# Patient Record
Sex: FEMALE | Race: OTHER | HISPANIC OR LATINO | URBAN - METROPOLITAN AREA
[De-identification: names, ages, dates, MRNs, and addresses within clinical notes are randomized per-mention and may not be internally consistent; named-entity substitution may affect disease eponyms.]

---
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
## 2020-01-04 ENCOUNTER — EMERGENCY (EMERGENCY)
Facility: HOSPITAL | Age: 20
LOS: 0 days | Discharge: HOME | End: 2020-01-04
Attending: EMERGENCY MEDICINE | Admitting: EMERGENCY MEDICINE
Payer: MEDICAID

## 2020-01-04 VITALS
RESPIRATION RATE: 16 BRPM | DIASTOLIC BLOOD PRESSURE: 56 MMHG | SYSTOLIC BLOOD PRESSURE: 100 MMHG | HEART RATE: 104 BPM | OXYGEN SATURATION: 97 % | TEMPERATURE: 100 F

## 2020-01-04 VITALS
DIASTOLIC BLOOD PRESSURE: 65 MMHG | TEMPERATURE: 98 F | OXYGEN SATURATION: 99 % | SYSTOLIC BLOOD PRESSURE: 108 MMHG | HEART RATE: 99 BPM

## 2020-01-04 DIAGNOSIS — R50.9 FEVER, UNSPECIFIED: ICD-10-CM

## 2020-01-04 DIAGNOSIS — R63.0 ANOREXIA: ICD-10-CM

## 2020-01-04 DIAGNOSIS — R05 COUGH: ICD-10-CM

## 2020-01-04 PROCEDURE — 99283 EMERGENCY DEPT VISIT LOW MDM: CPT

## 2020-01-04 NOTE — ED PROVIDER NOTE - OBJECTIVE STATEMENT
19yF no pmhx UTD vax except flu vaccine c/o cough, fever tmax 103.5, mylagias, congestion x3 days; initially had decreased appetite and PO liquids but that has gotten better today; took 2 advil tabs once yesterday and 1 advil tab approx 1hr PTA. Pt is worried that fever has persisted for so long. Denies diarrhea.

## 2020-01-04 NOTE — ED PROVIDER NOTE - CLINICAL SUMMARY MEDICAL DECISION MAKING FREE TEXT BOX
pw cough, myalgias, and fever. lungs clear and exam nonfocal and nontoxic. Tolerating PO. Patient to be discharged from ED. Any available test results were discussed with patient and/or family. Verbal instructions given, including instructions to return to ED immediately for any new, worsening, or concerning symptoms. Patient endorsed understanding. Written discharge instructions additionally given, including follow-up plan.

## 2020-01-04 NOTE — ED PROVIDER NOTE - PATIENT PORTAL LINK FT
You can access the FollowMyHealth Patient Portal offered by [facility] by registering at the following website: [email or web address]. By joining [facility]’s FollowMyHealth portal, you will also be able to view your health information using other applications (apps) compatible with our system.

## 2020-01-04 NOTE — ED PROVIDER NOTE - NS ED ROS FT
Review of Systems:  	•	CONSTITUTIONAL - +fever, no diaphoresis  	•	SKIN - no rash, no lesions  	•	HEMATOLOGIC - no bleeding, no bruising  	•	EYES - no discharge, no injection  	•	ENT - no otorrhea, +rhinorrhea  	•	RESPIRATORY - no shortness of breath, +cough  	•	CARDIAC - no chest pain, no palpitations  	•	GI - no abd pain, no nausea, no vomiting, no diarrhea  	•	GENITO-URINARY - no dysuria, no hematuria  	•	MUSCULOSKELETAL - no joint paint, no swelling, no redness  	•	NEUROLOGIC - no dizziness, no headache

## 2020-01-04 NOTE — ED PROVIDER NOTE - CARE PROVIDER_API CALL
Annabella Hercules (MD)  Pediatrics  [address] [place] [address]  Phone: [phone]  Fax: [phone]  Follow Up Time: 1-3 Days

## 2020-01-04 NOTE — ED PROVIDER NOTE - ATTENDING CONTRIBUTION TO CARE
19y F no PMH, imm UTD, nl birth history, pw fever, mild cough, sore throat, tolerating PO, x 3 days. Responsive to advil, last dose this afternoon.  Exam: NAD, NCAT, HEENT: mmm, EOMI, PERRLA. OP nl, no swelling or exudates, no midline uvula deviation. TMs b/l clear, Neck: supple, nontender, nl ROM, Heart: tachycardia RR, no murmur, Lungs: BCTA, no signs of increased WOB, Abd: NTND, no guarding or rebound, no hernia palpated, no organomegaly, no CVAT. Skin: No rash. MSK: chest, back, and ext nontender, nl rom, no deformity. Neuro: Alert, cooperative, SUNSHINE equally, normal behavior, interaction and coordination for age.   A/p: Likely viral infection as pt has fever for short period and nonfocal symtoms, tolerating PO Given return precautions for prolonged fever and dehydration and instructions for home care and f/u.

## 2020-01-04 NOTE — ED PROVIDER NOTE - PHYSICAL EXAMINATION
Vital Signs: Reviewed  GEN: alert, NAD  HEAD:  normocephalic, atraumatic  EYES:  PERRLA; conjunctivae without injection, drainage or discharge  ENMT:  tympanic membranes pearly gray with normal landmarks; nasal mucosa moist; mouth moist without ulcerations or lesions; throat moist without erythema, exudate, ulcerations or lesions  NECK:  supple, no masses, no significant lymphadenopathy  CARDIAC:  regular rate, normal S1 and S2, no murmurs, rubs or gallops  RESP:  respiratory rate and effort appear normal for age; lungs are clear to auscultation bilaterally; no rales or wheezes  ABDOMEN:  soft, nontender, nondistended, no masses  MUSCULOSKELETAL/NEURO:  normal movement, normal tone  SKIN:  normal skin color for age and race, well-perfused; warm and dry

## 2020-01-05 DIAGNOSIS — M67.40 GANGLION, UNSPECIFIED SITE: Chronic | ICD-10-CM

## 2021-05-18 PROBLEM — Z78.9 OTHER SPECIFIED HEALTH STATUS: Chronic | Status: ACTIVE | Noted: 2020-01-05

## 2021-05-27 ENCOUNTER — APPOINTMENT (OUTPATIENT)
Dept: ANTEPARTUM | Facility: CLINIC | Age: 21
End: 2021-05-27
Payer: MEDICAID

## 2021-05-27 ENCOUNTER — ASOB RESULT (OUTPATIENT)
Age: 21
End: 2021-05-27

## 2021-05-27 VITALS
WEIGHT: 95 LBS | HEART RATE: 68 BPM | SYSTOLIC BLOOD PRESSURE: 100 MMHG | HEIGHT: 63 IN | BODY MASS INDEX: 16.83 KG/M2 | TEMPERATURE: 98 F | DIASTOLIC BLOOD PRESSURE: 60 MMHG

## 2021-05-27 DIAGNOSIS — Z36.9 ENCOUNTER FOR ANTENATAL SCREENING, UNSPECIFIED: ICD-10-CM

## 2021-05-27 PROCEDURE — 76813 OB US NUCHAL MEAS 1 GEST: CPT

## 2021-06-06 LAB
CLARI ADDITIONAL INFO: NORMAL
CLARI CHROMOSOME 13: NORMAL
CLARI CHROMOSOME 18: NORMAL
CLARI CHROMOSOME 21: NORMAL
CLARI SEX CHROMOSOMES: NORMAL
CLARITEST NIPT: NORMAL

## 2021-07-23 ENCOUNTER — APPOINTMENT (OUTPATIENT)
Dept: ANTEPARTUM | Facility: CLINIC | Age: 21
End: 2021-07-23
Payer: MEDICAID

## 2021-07-23 ENCOUNTER — ASOB RESULT (OUTPATIENT)
Age: 21
End: 2021-07-23

## 2021-07-23 VITALS
TEMPERATURE: 98 F | HEIGHT: 63 IN | DIASTOLIC BLOOD PRESSURE: 68 MMHG | WEIGHT: 106 LBS | SYSTOLIC BLOOD PRESSURE: 100 MMHG | BODY MASS INDEX: 18.78 KG/M2

## 2021-07-23 PROCEDURE — 76817 TRANSVAGINAL US OBSTETRIC: CPT | Mod: 26

## 2021-07-23 PROCEDURE — 76805 OB US >/= 14 WKS SNGL FETUS: CPT | Mod: 26

## 2021-10-13 ENCOUNTER — ASOB RESULT (OUTPATIENT)
Age: 21
End: 2021-10-13

## 2021-10-13 ENCOUNTER — APPOINTMENT (OUTPATIENT)
Dept: ANTEPARTUM | Facility: CLINIC | Age: 21
End: 2021-10-13
Payer: MEDICAID

## 2021-10-13 VITALS
SYSTOLIC BLOOD PRESSURE: 110 MMHG | HEIGHT: 63 IN | DIASTOLIC BLOOD PRESSURE: 60 MMHG | BODY MASS INDEX: 21.44 KG/M2 | WEIGHT: 121 LBS

## 2021-10-13 PROCEDURE — 76816 OB US FOLLOW-UP PER FETUS: CPT | Mod: 26

## 2021-11-20 ENCOUNTER — OUTPATIENT (OUTPATIENT)
Dept: OUTPATIENT SERVICES | Facility: HOSPITAL | Age: 21
LOS: 1 days | Discharge: HOME | End: 2021-11-20

## 2021-11-20 VITALS
HEART RATE: 76 BPM | DIASTOLIC BLOOD PRESSURE: 72 MMHG | TEMPERATURE: 98 F | SYSTOLIC BLOOD PRESSURE: 119 MMHG | RESPIRATION RATE: 20 BRPM

## 2021-11-20 VITALS — SYSTOLIC BLOOD PRESSURE: 129 MMHG | DIASTOLIC BLOOD PRESSURE: 80 MMHG | HEART RATE: 76 BPM

## 2021-11-20 DIAGNOSIS — M67.40 GANGLION, UNSPECIFIED SITE: Chronic | ICD-10-CM

## 2021-11-20 NOTE — OB PROVIDER TRIAGE NOTE - ADDITIONAL INSTRUCTIONS
-F/u with PMD at next visit on Wednesday  -PO maternal hydration, FKC, labor precautions  -Reassuring maternal and fetal status

## 2021-11-20 NOTE — OB PROVIDER TRIAGE NOTE - NS_OBGYNHISTORY_OBGYN_ALL_OB_FT
Ob Hx:   MAB x1, D&C unknown    Gyn Hx: Denies uterine fibroids, ovarian cysts, abnormal paps, STIs Ob Hx:   MAB x1, D&C unknown    Gyn Hx: Denies ovarian cysts, abnormal paps, STIs. History of fibroids noticed 3 years ago treated conservatively.

## 2021-11-20 NOTE — OB PROVIDER TRIAGE NOTE - HISTORY OF PRESENT ILLNESS
Anesthesia Evaluation     Patient summary reviewed and Nursing notes reviewed   NPO Solid Status: > 8 hours  NPO Liquid Status: > 8 hours           Airway   Mallampati: II  TM distance: >3 FB  Neck ROM: full  Dental    (+) poor dentition        Pulmonary    Cardiovascular   Exercise tolerance: good (4-7 METS)    Rhythm: regular  Rate: normal        Neuro/Psych  (+) headaches, psychiatric history,     GI/Hepatic/Renal/Endo      Musculoskeletal     Abdominal     Abdomen: soft.   Substance History      OB/GYN          Other                        Anesthesia Plan    ASA 2     general     intravenous induction     Anesthetic plan, all risks, benefits, and alternatives have been provided, discussed and informed consent has been obtained with: patient.    Plan discussed with CRNA.       22yo  at 38w3d, BIBIANA 21 by LMP (21) c/w 1st trim sono, presents to L&D with contractions. They originally began Thursday night but more recently are every 5 minutes, pain 7/10 intensity. Reports some white discharge for the last week. Denies LOF, VB. Reports good FM. Elevated 1 hr GCT, normal 3 hr GTT. Denies any complications this pregnancy. GBS negative.

## 2021-11-20 NOTE — OB PROVIDER TRIAGE NOTE - NSHPPHYSICALEXAM_GEN_ALL_CORE
Physical exam:    Vital Signs Last 24 Hrs  T(F): 98 (20 Nov 2021 03:39), Max: 98 (20 Nov 2021 03:39)  HR: 76 (20 Nov 2021 03:39) (76 - 76)  BP: 119/72 (20 Nov 2021 03:39) (119/72 - 119/72)  RR: 20 (20 Nov 2021 03:39) (20 - 20)    Gen: AAOx3, NAD  Abdomen: Soft, nontender, no distension, gravid, palpable contractions    EFM: 155/mod juan jose/pos accel  toco: q5-10  SVE: 1/80/-2, vtx, intact  BSS: vertex, posterior placenta, MVP 4.5cm, BPP 8/8

## 2021-11-20 NOTE — OB PROVIDER TRIAGE NOTE - NSOBPROVIDERNOTE_OBGYN_ALL_OB_FT
22yo  at 38w3d, GBS neg, likely in early labor, to ambulate with reassuring maternal and fetal status.   -NST reactive, BPP 8/8, MVP nl  -Patient does not desire pain medication at this time  -F/u with PMD at next visit on Wednesday  -PO maternal hydration, FKC, and labor precautions reviewed    Dr Byrne and Dr Kunz aware.

## 2021-11-20 NOTE — OB RN TRIAGE NOTE - CHIEF COMPLAINT QUOTE
pt presented to l&d via w/c accompanied by ed staff member. pt c/o contractions, pt denies rupture of membranes and small amount of vaginal bleeding.pt placed in triage EXAM A

## 2021-11-20 NOTE — OB PROVIDER TRIAGE NOTE - NSHPLABSRESULTS_GEN_ALL_CORE
10/28  GBS neg    9/10/21  HIV NR    1hr   3hr GTT 85/166/153/119  A1C 5.4%     4/19/21  Rubella nonimmune  O pos  Ab screen neg  RPR NR  HbS Ag NR  UCx pos for e.coli    Genetics:  AFP Quad screen neg  NIPT low risk  SMA neg  Fragile X neg  CF neg    Sonos:   @33w0d: +FHR, vtx, posterior placenta, MVP 4.29cm, EFW 2384g (79%)  @21w2d: +FHR, vtx, post placenta, MVP 4.4cm, EFW 444g, anatomy WNL, CL 4.7cm  @13w1d: +FHR, post placenta, NT 82%, NB present, adnexa WNL

## 2021-11-21 ENCOUNTER — INPATIENT (INPATIENT)
Facility: HOSPITAL | Age: 21
LOS: 3 days | Discharge: HOME | End: 2021-11-25
Attending: OBSTETRICS & GYNECOLOGY | Admitting: OBSTETRICS & GYNECOLOGY
Payer: MEDICAID

## 2021-11-21 DIAGNOSIS — M67.40 GANGLION, UNSPECIFIED SITE: Chronic | ICD-10-CM

## 2021-11-21 LAB
ALBUMIN SERPL ELPH-MCNC: 3.4 G/DL — LOW (ref 3.5–5.2)
ALP SERPL-CCNC: 417 U/L — HIGH (ref 30–115)
ALT FLD-CCNC: 53 U/L — HIGH (ref 0–41)
ANION GAP SERPL CALC-SCNC: 21 MMOL/L — HIGH (ref 7–14)
APPEARANCE UR: ABNORMAL
AST SERPL-CCNC: 40 U/L — SIGNIFICANT CHANGE UP (ref 0–41)
BACTERIA # UR AUTO: ABNORMAL
BASOPHILS # BLD AUTO: 0.04 K/UL — SIGNIFICANT CHANGE UP (ref 0–0.2)
BASOPHILS # BLD AUTO: 0.04 K/UL — SIGNIFICANT CHANGE UP (ref 0–0.2)
BASOPHILS NFR BLD AUTO: 0.3 % — SIGNIFICANT CHANGE UP (ref 0–1)
BASOPHILS NFR BLD AUTO: 0.3 % — SIGNIFICANT CHANGE UP (ref 0–1)
BILIRUB SERPL-MCNC: 1.8 MG/DL — HIGH (ref 0.2–1.2)
BILIRUB UR-MCNC: ABNORMAL
BLD GP AB SCN SERPL QL: SIGNIFICANT CHANGE UP
BUN SERPL-MCNC: 11 MG/DL — SIGNIFICANT CHANGE UP (ref 10–20)
CALCIUM SERPL-MCNC: 8.7 MG/DL — SIGNIFICANT CHANGE UP (ref 8.5–10.1)
CHLORIDE SERPL-SCNC: 97 MMOL/L — LOW (ref 98–110)
CO2 SERPL-SCNC: 14 MMOL/L — LOW (ref 17–32)
COLOR SPEC: ABNORMAL
CREAT SERPL-MCNC: 0.9 MG/DL — SIGNIFICANT CHANGE UP (ref 0.7–1.5)
DIFF PNL FLD: ABNORMAL
EOSINOPHIL # BLD AUTO: 0 K/UL — SIGNIFICANT CHANGE UP (ref 0–0.7)
EOSINOPHIL # BLD AUTO: 0 K/UL — SIGNIFICANT CHANGE UP (ref 0–0.7)
EOSINOPHIL NFR BLD AUTO: 0 % — SIGNIFICANT CHANGE UP (ref 0–8)
EOSINOPHIL NFR BLD AUTO: 0 % — SIGNIFICANT CHANGE UP (ref 0–8)
EPI CELLS # UR: 5 /HPF — SIGNIFICANT CHANGE UP (ref 0–5)
GLUCOSE SERPL-MCNC: 115 MG/DL — HIGH (ref 70–99)
GLUCOSE UR QL: NEGATIVE — SIGNIFICANT CHANGE UP
HCT VFR BLD CALC: 36.3 % — LOW (ref 37–47)
HCT VFR BLD CALC: 36.8 % — LOW (ref 37–47)
HGB BLD-MCNC: 12 G/DL — SIGNIFICANT CHANGE UP (ref 12–16)
HGB BLD-MCNC: 12.5 G/DL — SIGNIFICANT CHANGE UP (ref 12–16)
HYALINE CASTS # UR AUTO: 4 /LPF — SIGNIFICANT CHANGE UP (ref 0–7)
IMM GRANULOCYTES NFR BLD AUTO: 1 % — HIGH (ref 0.1–0.3)
IMM GRANULOCYTES NFR BLD AUTO: 1.1 % — HIGH (ref 0.1–0.3)
KETONES UR-MCNC: ABNORMAL
LACTATE SERPL-SCNC: 5.5 MMOL/L — CRITICAL HIGH (ref 0.7–2)
LEUKOCYTE ESTERASE UR-ACNC: ABNORMAL
LYMPHOCYTES # BLD AUTO: 1.05 K/UL — LOW (ref 1.2–3.4)
LYMPHOCYTES # BLD AUTO: 1.36 K/UL — SIGNIFICANT CHANGE UP (ref 1.2–3.4)
LYMPHOCYTES # BLD AUTO: 11.3 % — LOW (ref 20.5–51.1)
LYMPHOCYTES # BLD AUTO: 7.7 % — LOW (ref 20.5–51.1)
MCHC RBC-ENTMCNC: 30.4 PG — SIGNIFICANT CHANGE UP (ref 27–31)
MCHC RBC-ENTMCNC: 31.6 PG — HIGH (ref 27–31)
MCHC RBC-ENTMCNC: 32.6 G/DL — SIGNIFICANT CHANGE UP (ref 32–37)
MCHC RBC-ENTMCNC: 34.4 G/DL — SIGNIFICANT CHANGE UP (ref 32–37)
MCV RBC AUTO: 91.9 FL — SIGNIFICANT CHANGE UP (ref 81–99)
MCV RBC AUTO: 93.2 FL — SIGNIFICANT CHANGE UP (ref 81–99)
MONOCYTES # BLD AUTO: 0.4 K/UL — SIGNIFICANT CHANGE UP (ref 0.1–0.6)
MONOCYTES # BLD AUTO: 0.83 K/UL — HIGH (ref 0.1–0.6)
MONOCYTES NFR BLD AUTO: 3.3 % — SIGNIFICANT CHANGE UP (ref 1.7–9.3)
MONOCYTES NFR BLD AUTO: 6.1 % — SIGNIFICANT CHANGE UP (ref 1.7–9.3)
NEUTROPHILS # BLD AUTO: 10.12 K/UL — HIGH (ref 1.4–6.5)
NEUTROPHILS # BLD AUTO: 11.62 K/UL — HIGH (ref 1.4–6.5)
NEUTROPHILS NFR BLD AUTO: 84 % — HIGH (ref 42.2–75.2)
NEUTROPHILS NFR BLD AUTO: 84.9 % — HIGH (ref 42.2–75.2)
NITRITE UR-MCNC: POSITIVE
NRBC # BLD: 0 /100 WBCS — SIGNIFICANT CHANGE UP (ref 0–0)
NRBC # BLD: 0 /100 WBCS — SIGNIFICANT CHANGE UP (ref 0–0)
PH UR: 6.5 — SIGNIFICANT CHANGE UP (ref 5–8)
PLATELET # BLD AUTO: 359 K/UL — SIGNIFICANT CHANGE UP (ref 130–400)
PLATELET # BLD AUTO: 395 K/UL — SIGNIFICANT CHANGE UP (ref 130–400)
POTASSIUM SERPL-MCNC: 4.2 MMOL/L — SIGNIFICANT CHANGE UP (ref 3.5–5)
POTASSIUM SERPL-SCNC: 4.2 MMOL/L — SIGNIFICANT CHANGE UP (ref 3.5–5)
PRENATAL SYPHILIS TEST: SIGNIFICANT CHANGE UP
PROT SERPL-MCNC: 6.3 G/DL — SIGNIFICANT CHANGE UP (ref 6–8)
PROT UR-MCNC: ABNORMAL
RBC # BLD: 3.95 M/UL — LOW (ref 4.2–5.4)
RBC # BLD: 3.95 M/UL — LOW (ref 4.2–5.4)
RBC # FLD: 13 % — SIGNIFICANT CHANGE UP (ref 11.5–14.5)
RBC # FLD: 13.2 % — SIGNIFICANT CHANGE UP (ref 11.5–14.5)
RBC CASTS # UR COMP ASSIST: 34 /HPF — HIGH (ref 0–4)
SARS-COV-2 RNA SPEC QL NAA+PROBE: SIGNIFICANT CHANGE UP
SODIUM SERPL-SCNC: 132 MMOL/L — LOW (ref 135–146)
SP GR SPEC: 1.03 — SIGNIFICANT CHANGE UP (ref 1.01–1.03)
UROBILINOGEN FLD QL: ABNORMAL
WBC # BLD: 12.05 K/UL — HIGH (ref 4.8–10.8)
WBC # BLD: 13.67 K/UL — HIGH (ref 4.8–10.8)
WBC # FLD AUTO: 12.05 K/UL — HIGH (ref 4.8–10.8)
WBC # FLD AUTO: 13.67 K/UL — HIGH (ref 4.8–10.8)
WBC UR QL: 210 /HPF — HIGH (ref 0–5)

## 2021-11-21 RX ORDER — AMPICILLIN TRIHYDRATE 250 MG
2 CAPSULE ORAL EVERY 6 HOURS
Refills: 0 | Status: DISCONTINUED | OUTPATIENT
Start: 2021-11-21 | End: 2021-11-22

## 2021-11-21 RX ORDER — GENTAMICIN SULFATE 40 MG/ML
80 VIAL (ML) INJECTION EVERY 8 HOURS
Refills: 0 | Status: DISCONTINUED | OUTPATIENT
Start: 2021-11-21 | End: 2021-11-22

## 2021-11-21 RX ORDER — OXYTOCIN 10 UNIT/ML
333.33 VIAL (ML) INJECTION
Qty: 20 | Refills: 0 | Status: DISCONTINUED | OUTPATIENT
Start: 2021-11-21 | End: 2021-11-22

## 2021-11-21 RX ORDER — DEXAMETHASONE 0.5 MG/5ML
4 ELIXIR ORAL EVERY 6 HOURS
Refills: 0 | Status: DISCONTINUED | OUTPATIENT
Start: 2021-11-21 | End: 2021-11-22

## 2021-11-21 RX ORDER — ACETAMINOPHEN 500 MG
650 TABLET ORAL ONCE
Refills: 0 | Status: COMPLETED | OUTPATIENT
Start: 2021-11-21 | End: 2021-11-21

## 2021-11-21 RX ORDER — AMPICILLIN TRIHYDRATE 250 MG
CAPSULE ORAL
Refills: 0 | Status: DISCONTINUED | OUTPATIENT
Start: 2021-11-21 | End: 2021-11-22

## 2021-11-21 RX ORDER — NALOXONE HYDROCHLORIDE 4 MG/.1ML
0.1 SPRAY NASAL
Refills: 0 | Status: DISCONTINUED | OUTPATIENT
Start: 2021-11-21 | End: 2021-11-22

## 2021-11-21 RX ORDER — AMPICILLIN TRIHYDRATE 250 MG
2 CAPSULE ORAL ONCE
Refills: 0 | Status: COMPLETED | OUTPATIENT
Start: 2021-11-21 | End: 2021-11-21

## 2021-11-21 RX ORDER — FENTANYL/BUPIVACAINE/NS/PF 2MCG/ML-.1
250 PLASTIC BAG, INJECTION (ML) INJECTION
Refills: 0 | Status: DISCONTINUED | OUTPATIENT
Start: 2021-11-21 | End: 2021-11-22

## 2021-11-21 RX ORDER — ONDANSETRON 8 MG/1
4 TABLET, FILM COATED ORAL EVERY 6 HOURS
Refills: 0 | Status: DISCONTINUED | OUTPATIENT
Start: 2021-11-21 | End: 2021-11-22

## 2021-11-21 RX ORDER — CEFTRIAXONE 500 MG/1
1000 INJECTION, POWDER, FOR SOLUTION INTRAMUSCULAR; INTRAVENOUS EVERY 24 HOURS
Refills: 0 | Status: DISCONTINUED | OUTPATIENT
Start: 2021-11-21 | End: 2021-11-21

## 2021-11-21 RX ORDER — CITRIC ACID/SODIUM CITRATE 300-500 MG
15 SOLUTION, ORAL ORAL EVERY 6 HOURS
Refills: 0 | Status: DISCONTINUED | OUTPATIENT
Start: 2021-11-21 | End: 2021-11-21

## 2021-11-21 RX ORDER — SODIUM CHLORIDE 9 MG/ML
1000 INJECTION, SOLUTION INTRAVENOUS
Refills: 0 | Status: DISCONTINUED | OUTPATIENT
Start: 2021-11-21 | End: 2021-11-22

## 2021-11-21 RX ADMIN — Medication 650 MILLIGRAM(S): at 17:41

## 2021-11-21 RX ADMIN — Medication 650 MILLIGRAM(S): at 17:42

## 2021-11-21 RX ADMIN — Medication 104 MILLIGRAM(S): at 13:33

## 2021-11-21 RX ADMIN — Medication 650 MILLIGRAM(S): at 13:45

## 2021-11-21 RX ADMIN — CEFTRIAXONE 100 MILLIGRAM(S): 500 INJECTION, POWDER, FOR SOLUTION INTRAMUSCULAR; INTRAVENOUS at 15:34

## 2021-11-21 RX ADMIN — Medication 216 GRAM(S): at 20:32

## 2021-11-21 RX ADMIN — Medication 104 MILLIGRAM(S): at 22:03

## 2021-11-21 RX ADMIN — Medication 650 MILLIGRAM(S): at 18:42

## 2021-11-21 RX ADMIN — Medication 216 GRAM(S): at 13:48

## 2021-11-21 RX ADMIN — Medication 650 MILLIGRAM(S): at 13:46

## 2021-11-21 NOTE — OB PROVIDER H&P - ATTENDING COMMENTS
IMP: IUP @ 38.4wks, Chorio, in early labor    Plan: Admit, IV-Abx, Blood Cultures, Pain Management, Tylenol, Anticipated

## 2021-11-21 NOTE — PROGRESS NOTE ADULT - ASSESSMENT
20yo  @38w4d, GBS neg, s/p SROM clear @1100 on , with chorioamnionitis vs pyelonephritis, febrile, early in labor, s/p AROM forewaters blood tinged @1510, with maternal and fetal tachycardia, cat II tracing.  -Cooling blanket started at 2100  -Resuscitative measures performed with oxygen, repositioning and IV hydration  -Continue amp and gent  -Pain management  -Vital signs  -f/u UDS and UCx    Dr. Magana and Dr. Kunz aware   22yo  @38w4d, GBS neg, s/p SROM clear @1100 on , with chorioamnionitis vs pyelonephritis, febrile, early in labor, s/p AROM forewaters blood tinged @1510, with maternal and fetal tachycardia, elevated lactate, and with cat II tracing.  -Cooling blanket started at 2100  -Resuscitative measures performed with oxygen, repositioning and IV hydration  -Continue amp and gent  -Pain management  -Vital signs  -f/u UDS and UCx    Dr. Magana and Dr. Kunz aware

## 2021-11-21 NOTE — OB PROVIDER H&P - NSHPLABSRESULTS_GEN_ALL_CORE
Labs:  HCV NR  varicella immune  SMA neg  CF neg  Fragile X neg    GTT 85/166/153/119  A1C 5.4%  Sequential 1/2 low risk  NIPT low risk XY    Sono:  10/13: 3w0d, EFW 2384g (79%), MVP 4.29cm  7/23: 21w2d, EFW 444g, MVP 4.4cm, normal anatomy

## 2021-11-21 NOTE — OB RN DELIVERY SUMMARY - NSSELHIDDEN_OBGYN_ALL_OB_FT
[NS_DeliveryAttending1_OBGYN_ALL_OB_FT:[id number]] [NS_DeliveryAttending1_OBGYN_ALL_OB_FT:[id number]],[NS_DeliveryRN_OBGYN_ALL_OB_FT:[id number]],[NS_CirculateRN2_OBGYN_ALL_OB_FT:[id number]]

## 2021-11-21 NOTE — PROGRESS NOTE ADULT - SUBJECTIVE AND OBJECTIVE BOX
PGY4 Note    Patient seen at bedside for evaluation, feel comfortable after epidural.    Vital signs  T(F): 99.68 ( @ 12:34), Max: 99.68 ( @ 12:34)  HR: 83 ( @ 15:01)  BP: 118/65 ( @ 15:01) (112/67 - 136/74)  RR: --  EFM: 165/mod/+accels  TOCO: q 1-3min  SVE: 4//-1 AROM forewaters blood tinged    Medications:  acetaminophen     Tablet ..: 650 ( @ 13:45)  acetaminophen  Suppository ..: 650 ( @ 13:46)  ampicillin  IVPB: 216 ( @ 13:48)  gentamicin   IVPB: 104 ( @ 13:33)      Labs:                        12.5   13.67 )-----------( 395      ( 2021 11:51 )             36.3           Prenatal Syphilis Test: Nonreact ( @ 11:51)  Antibody Screen: NEG (21 @ 11:51)    Urinalysis Basic - ( 2021 12:01 )    Color: [place] / Appearance: Slightly Turbid / S.026 / pH: x  Gluc: x / Ketone: Large  / Bili: Small / Urobili: 3 mg/dL   Blood: x / Protein: 100 mg/dL / Nitrite: Positive   Leuk Esterase: Large / RBC: 34 /HPF /  /HPF   Sq Epi: x / Non Sq Epi: 5 /HPF / Bacteria: Many      UDS received and pending      Urine culture pending collection (RN aware to collect)

## 2021-11-21 NOTE — OB PROVIDER H&P - HISTORY OF PRESENT ILLNESS
22yo  @38w4d with BIBIANA  by LMP  consistent with first trimester sonogram presenting to L&D for clear leakage of fluid since yesterday @1100 and contractions for the last 3 days.  Contractions are now q4m, desires pain management with epidural.  Denies vaginal bleeding. Good fetal movement. Denies any complications with this pregnancy. GBS neg.    On admission, patient's HR was 108 and fetal heart rate was tachycardic to 170s.   Patient's rectal temperature was 101.7F.

## 2021-11-21 NOTE — OB PROVIDER H&P - ASSESSMENT
20yo  @38w4d, GBS neg, s/p SROM clear, with chorioamnionitis, in early labor.    - admit to L&D  - cont efm/toco  - clear liquid diet, IVF  - pain management with epidural  - ampicillin/gentamicin for chorioamnionitis  - f/u admission labs, COVID swab    Dr. Kunz aware.

## 2021-11-21 NOTE — OB PROVIDER H&P - NSHPPHYSICALEXAM_GEN_ALL_CORE
Vital Signs Last 24 Hrs  HR: 117 (21 Nov 2021 11:28) (117 - 117)  BP: 132/83 (21 Nov 2021 11:28) (132/83 - 132/83)    Gen: NAD, sitting comfortably  Abd: Gravid, soft, NT, strongly palpable ctx  SVE: 3/90/-2, vtx, with palpable forewaters  EFM: 170/mod/+accels  [place]: q3m  Sono: cephalic

## 2021-11-21 NOTE — PROGRESS NOTE ADULT - ASSESSMENT
20yo  @38w4d, GBS neg, s/p SROM clear @1100 on , with chorioamnionitis vs pyelonephritis, in labor, s/p AROM forewaters blood tinged, with cat II tracing    -resuscitative measures performed with oxygen, repositioning and IV hyration  -Continue amp and gent  -1 dose of Rocephin  -Pain management  -Vital signs  -f/u pending Uctx   -f/u UDS    Dr. Kunz aware

## 2021-11-21 NOTE — PROGRESS NOTE ADULT - SUBJECTIVE AND OBJECTIVE BOX
PGY1 Note    S: Patient seen and examined at bedside. Patient is comfortable s/p epidural.     Vital Signs Last 24 Hrs  T(C): 39.5 (2021 18:31), Max: 39.5 (2021 18:31)  T(F): 103.1 (2021 18:31), Max: 103.1 (2021 18:31)  HR: 107 (2021 21:00) (81 - 174)  BP: 123/80 (2021 21:00) (101/60 - 148/77)  SpO2: 100% (2021 19:58) (93% - 100%)    EFM: 180/min juan jose/late decels  TOCO: q5  SVE: deferred at this time, last exam @1810: 4.5/100/0, vertex, ruptured clear    Labs:                        12.0   12.05 )-----------( 359      ( 2021 18:09 )             36.8           132<L>  |  97<L>  |  11  ----------------------------<  115<H>  4.2   |  14<L>  |  0.9    Ca    8.7      2021 18:09    TPro  6.3  /  Alb  3.4<L>  /  TBili  1.8<H>  /  DBili  x   /  AST  40  /  ALT  53<H>  /  AlkPhos  417<H>      ABO RH Interpretation: O POS (21 @ 11:51)    Urinalysis Basic - ( 2021 12:01 )  Color: [place] / Appearance: Slightly Turbid / S.026 / pH: x  Gluc: x / Ketone: Large  / Bili: Small / Urobili: 3 mg/dL   Blood: x / Protein: 100 mg/dL / Nitrite: Positive   Leuk Esterase: Large / RBC: 34 /HPF /  /HPF   Sq Epi: x / Non Sq Epi: 5 /HPF / Bacteria: Many    Prenatal Syphilis Test: Nonreact (21 @ 11:51)    UDS: pending  Covid: neg  UCx: pending    MEDICATIONS  (STANDING):  ampicillin  IVPB      ampicillin  IVPB 2 Gram(s) IV Intermittent every 6 hours  fentanyl (2 MICROgram(s)/mL) + bupivacaine 0.1%  in Sodium Chloride 0.9% Epidural Drip 250 milliLiter(s) Epidural Drip <Continuous> @1340  gentamicin   IVPB 80 milliGRAM(s) IV Intermittent every 8 hours @ 1330    Cooling blanket @ 2100 PGY1 Note    S: Patient seen and examined at bedside. Patient is comfortable s/p epidural.     Vital Signs Last 24 Hrs  T(C): 39.5 (2021 18:31), Max: 39.5 (2021 18:31)  T(F): 103.1 (2021 18:31), Max: 103.1 (2021 18:31)  HR: 107 (2021 21:00) (81 - 174)  BP: 123/80 (2021 21:00) (101/60 - 148/77)  SpO2: 100% (2021 19:58) (93% - 100%)    EFM: 180/min juan jose/late decels  TOCO: q5  SVE: deferred at this time, last exam @1810: 4.5/100/0, vertex, ruptured clear    Labs:                        12.0   12.05 )-----------( 359      ( 2021 18:09 )             36.8           132<L>  |  97<L>  |  11  ----------------------------<  115<H>  4.2   |  14<L>  |  0.9    Ca    8.7      2021 18:09    TPro  6.3  /  Alb  3.4<L>  /  TBili  1.8<H>  /  DBili  x   /  AST  40  /  ALT  53<H>  /  AlkPhos  417<H>      ABO RH Interpretation: O POS (21 @ 11:51)    Urinalysis Basic - ( 2021 12:01 )  Color: [place] / Appearance: Slightly Turbid / S.026 / pH: x  Gluc: x / Ketone: Large  / Bili: Small / Urobili: 3 mg/dL   Blood: x / Protein: 100 mg/dL / Nitrite: Positive   Leuk Esterase: Large / RBC: 34 /HPF /  /HPF   Sq Epi: x / Non Sq Epi: 5 /HPF / Bacteria: Many    Lactate @1800: 5.5    Prenatal Syphilis Test: Nonreact (21 @ 11:51)    UDS: pending  Covid: neg  UCx: pending    MEDICATIONS  (STANDING):  ampicillin  IVPB      ampicillin  IVPB 2 Gram(s) IV Intermittent every 6 hours  fentanyl (2 MICROgram(s)/mL) + bupivacaine 0.1%  in Sodium Chloride 0.9% Epidural Drip 250 milliLiter(s) Epidural Drip <Continuous> @1340  gentamicin   IVPB 80 milliGRAM(s) IV Intermittent every 8 hours @ 1330    Cooling blanket @ 2100

## 2021-11-22 ENCOUNTER — RESULT REVIEW (OUTPATIENT)
Age: 21
End: 2021-11-22

## 2021-11-22 LAB
ALBUMIN SERPL ELPH-MCNC: 2.1 G/DL — LOW (ref 3.5–5.2)
ALBUMIN SERPL ELPH-MCNC: 2.7 G/DL — LOW (ref 3.5–5.2)
ALP SERPL-CCNC: 239 U/L — HIGH (ref 30–115)
ALP SERPL-CCNC: 338 U/L — HIGH (ref 30–115)
ALT FLD-CCNC: 31 U/L — SIGNIFICANT CHANGE UP (ref 0–41)
ALT FLD-CCNC: 48 U/L — HIGH (ref 0–41)
AMPHET UR-MCNC: NEGATIVE — SIGNIFICANT CHANGE UP
ANION GAP SERPL CALC-SCNC: 13 MMOL/L — SIGNIFICANT CHANGE UP (ref 7–14)
ANION GAP SERPL CALC-SCNC: 17 MMOL/L — HIGH (ref 7–14)
AST SERPL-CCNC: 41 U/L — SIGNIFICANT CHANGE UP (ref 0–41)
AST SERPL-CCNC: 43 U/L — HIGH (ref 0–41)
BARBITURATES UR SCN-MCNC: NEGATIVE — SIGNIFICANT CHANGE UP
BASOPHILS # BLD AUTO: 0.02 K/UL — SIGNIFICANT CHANGE UP (ref 0–0.2)
BASOPHILS # BLD AUTO: 0.02 K/UL — SIGNIFICANT CHANGE UP (ref 0–0.2)
BASOPHILS NFR BLD AUTO: 0.1 % — SIGNIFICANT CHANGE UP (ref 0–1)
BASOPHILS NFR BLD AUTO: 0.2 % — SIGNIFICANT CHANGE UP (ref 0–1)
BENZODIAZ UR-MCNC: NEGATIVE — SIGNIFICANT CHANGE UP
BILIRUB SERPL-MCNC: 0.5 MG/DL — SIGNIFICANT CHANGE UP (ref 0.2–1.2)
BILIRUB SERPL-MCNC: 1.8 MG/DL — HIGH (ref 0.2–1.2)
BUN SERPL-MCNC: 14 MG/DL — SIGNIFICANT CHANGE UP (ref 10–20)
BUN SERPL-MCNC: 16 MG/DL — SIGNIFICANT CHANGE UP (ref 10–20)
BUPRENORPHINE SCREEN, URINE RESULT: NEGATIVE — SIGNIFICANT CHANGE UP
CALCIUM SERPL-MCNC: 8 MG/DL — LOW (ref 8.5–10.1)
CALCIUM SERPL-MCNC: 8.4 MG/DL — LOW (ref 8.5–10.1)
CHLORIDE SERPL-SCNC: 107 MMOL/L — SIGNIFICANT CHANGE UP (ref 98–110)
CHLORIDE SERPL-SCNC: 94 MMOL/L — LOW (ref 98–110)
CO2 SERPL-SCNC: 16 MMOL/L — LOW (ref 17–32)
CO2 SERPL-SCNC: 18 MMOL/L — SIGNIFICANT CHANGE UP (ref 17–32)
COCAINE METAB.OTHER UR-MCNC: NEGATIVE — SIGNIFICANT CHANGE UP
CREAT SERPL-MCNC: 0.7 MG/DL — SIGNIFICANT CHANGE UP (ref 0.7–1.5)
CREAT SERPL-MCNC: 1.1 MG/DL — SIGNIFICANT CHANGE UP (ref 0.7–1.5)
EOSINOPHIL # BLD AUTO: 0 K/UL — SIGNIFICANT CHANGE UP (ref 0–0.7)
EOSINOPHIL # BLD AUTO: 0.02 K/UL — SIGNIFICANT CHANGE UP (ref 0–0.7)
EOSINOPHIL NFR BLD AUTO: 0 % — SIGNIFICANT CHANGE UP (ref 0–8)
EOSINOPHIL NFR BLD AUTO: 0.2 % — SIGNIFICANT CHANGE UP (ref 0–8)
FENTANYL UR QL: NEGATIVE — SIGNIFICANT CHANGE UP
GLUCOSE SERPL-MCNC: 110 MG/DL — HIGH (ref 70–99)
GLUCOSE SERPL-MCNC: 77 MG/DL — SIGNIFICANT CHANGE UP (ref 70–99)
HCT VFR BLD CALC: 24.9 % — LOW (ref 37–47)
HCT VFR BLD CALC: 32.4 % — LOW (ref 37–47)
HGB BLD-MCNC: 11 G/DL — LOW (ref 12–16)
HGB BLD-MCNC: 8.3 G/DL — LOW (ref 12–16)
IMM GRANULOCYTES NFR BLD AUTO: 1.1 % — HIGH (ref 0.1–0.3)
IMM GRANULOCYTES NFR BLD AUTO: 1.1 % — HIGH (ref 0.1–0.3)
L&D DRUG SCREEN, URINE: SIGNIFICANT CHANGE UP
LACTATE SERPL-SCNC: 1.6 MMOL/L — SIGNIFICANT CHANGE UP (ref 0.7–2)
LYMPHOCYTES # BLD AUTO: 0.48 K/UL — LOW (ref 1.2–3.4)
LYMPHOCYTES # BLD AUTO: 1.07 K/UL — LOW (ref 1.2–3.4)
LYMPHOCYTES # BLD AUTO: 3.6 % — LOW (ref 20.5–51.1)
LYMPHOCYTES # BLD AUTO: 8.7 % — LOW (ref 20.5–51.1)
MCHC RBC-ENTMCNC: 30.7 PG — SIGNIFICANT CHANGE UP (ref 27–31)
MCHC RBC-ENTMCNC: 30.9 PG — SIGNIFICANT CHANGE UP (ref 27–31)
MCHC RBC-ENTMCNC: 33.3 G/DL — SIGNIFICANT CHANGE UP (ref 32–37)
MCHC RBC-ENTMCNC: 34 G/DL — SIGNIFICANT CHANGE UP (ref 32–37)
MCV RBC AUTO: 91 FL — SIGNIFICANT CHANGE UP (ref 81–99)
MCV RBC AUTO: 92.2 FL — SIGNIFICANT CHANGE UP (ref 81–99)
METHADONE UR-MCNC: NEGATIVE — SIGNIFICANT CHANGE UP
MONOCYTES # BLD AUTO: 0.32 K/UL — SIGNIFICANT CHANGE UP (ref 0.1–0.6)
MONOCYTES # BLD AUTO: 0.39 K/UL — SIGNIFICANT CHANGE UP (ref 0.1–0.6)
MONOCYTES NFR BLD AUTO: 2.4 % — SIGNIFICANT CHANGE UP (ref 1.7–9.3)
MONOCYTES NFR BLD AUTO: 3.2 % — SIGNIFICANT CHANGE UP (ref 1.7–9.3)
NEUTROPHILS # BLD AUTO: 10.65 K/UL — HIGH (ref 1.4–6.5)
NEUTROPHILS # BLD AUTO: 12.39 K/UL — HIGH (ref 1.4–6.5)
NEUTROPHILS NFR BLD AUTO: 86.6 % — HIGH (ref 42.2–75.2)
NEUTROPHILS NFR BLD AUTO: 92.8 % — HIGH (ref 42.2–75.2)
NRBC # BLD: 0 /100 WBCS — SIGNIFICANT CHANGE UP (ref 0–0)
NRBC # BLD: 0 /100 WBCS — SIGNIFICANT CHANGE UP (ref 0–0)
OPIATES UR-MCNC: NEGATIVE — SIGNIFICANT CHANGE UP
OXYCODONE UR-MCNC: NEGATIVE — SIGNIFICANT CHANGE UP
PCP UR-MCNC: NEGATIVE — SIGNIFICANT CHANGE UP
PLATELET # BLD AUTO: 260 K/UL — SIGNIFICANT CHANGE UP (ref 130–400)
PLATELET # BLD AUTO: 295 K/UL — SIGNIFICANT CHANGE UP (ref 130–400)
POTASSIUM SERPL-MCNC: 3.5 MMOL/L — SIGNIFICANT CHANGE UP (ref 3.5–5)
POTASSIUM SERPL-MCNC: 4 MMOL/L — SIGNIFICANT CHANGE UP (ref 3.5–5)
POTASSIUM SERPL-SCNC: 3.5 MMOL/L — SIGNIFICANT CHANGE UP (ref 3.5–5)
POTASSIUM SERPL-SCNC: 4 MMOL/L — SIGNIFICANT CHANGE UP (ref 3.5–5)
PROPOXYPHENE QUALITATIVE URINE RESULT: NEGATIVE — SIGNIFICANT CHANGE UP
PROT SERPL-MCNC: 4.4 G/DL — LOW (ref 6–8)
PROT SERPL-MCNC: 5 G/DL — LOW (ref 6–8)
RBC # BLD: 2.7 M/UL — LOW (ref 4.2–5.4)
RBC # BLD: 3.56 M/UL — LOW (ref 4.2–5.4)
RBC # FLD: 13.2 % — SIGNIFICANT CHANGE UP (ref 11.5–14.5)
RBC # FLD: 13.3 % — SIGNIFICANT CHANGE UP (ref 11.5–14.5)
SODIUM SERPL-SCNC: 127 MMOL/L — LOW (ref 135–146)
SODIUM SERPL-SCNC: 138 MMOL/L — SIGNIFICANT CHANGE UP (ref 135–146)
WBC # BLD: 12.28 K/UL — HIGH (ref 4.8–10.8)
WBC # BLD: 13.36 K/UL — HIGH (ref 4.8–10.8)
WBC # FLD AUTO: 12.28 K/UL — HIGH (ref 4.8–10.8)
WBC # FLD AUTO: 13.36 K/UL — HIGH (ref 4.8–10.8)

## 2021-11-22 PROCEDURE — 93010 ELECTROCARDIOGRAM REPORT: CPT

## 2021-11-22 PROCEDURE — 88307 TISSUE EXAM BY PATHOLOGIST: CPT | Mod: 26

## 2021-11-22 PROCEDURE — 71045 X-RAY EXAM CHEST 1 VIEW: CPT | Mod: 26

## 2021-11-22 RX ORDER — LANOLIN
1 OINTMENT (GRAM) TOPICAL EVERY 6 HOURS
Refills: 0 | Status: DISCONTINUED | OUTPATIENT
Start: 2021-11-22 | End: 2021-11-25

## 2021-11-22 RX ORDER — DIBUCAINE 1 %
1 OINTMENT (GRAM) RECTAL EVERY 6 HOURS
Refills: 0 | Status: DISCONTINUED | OUTPATIENT
Start: 2021-11-22 | End: 2021-11-25

## 2021-11-22 RX ORDER — OXYCODONE HYDROCHLORIDE 5 MG/1
5 TABLET ORAL
Refills: 0 | Status: DISCONTINUED | OUTPATIENT
Start: 2021-11-22 | End: 2021-11-25

## 2021-11-22 RX ORDER — AER TRAVELER 0.5 G/1
1 SOLUTION RECTAL; TOPICAL EVERY 4 HOURS
Refills: 0 | Status: DISCONTINUED | OUTPATIENT
Start: 2021-11-22 | End: 2021-11-25

## 2021-11-22 RX ORDER — SODIUM CHLORIDE 9 MG/ML
1000 INJECTION, SOLUTION INTRAVENOUS
Refills: 0 | Status: DISCONTINUED | OUTPATIENT
Start: 2021-11-22 | End: 2021-11-22

## 2021-11-22 RX ORDER — OXYCODONE HYDROCHLORIDE 5 MG/1
5 TABLET ORAL ONCE
Refills: 0 | Status: DISCONTINUED | OUTPATIENT
Start: 2021-11-22 | End: 2021-11-25

## 2021-11-22 RX ORDER — AMPICILLIN TRIHYDRATE 250 MG
CAPSULE ORAL
Refills: 0 | Status: DISCONTINUED | OUTPATIENT
Start: 2021-11-22 | End: 2021-11-22

## 2021-11-22 RX ORDER — SIMETHICONE 80 MG/1
80 TABLET, CHEWABLE ORAL EVERY 4 HOURS
Refills: 0 | Status: DISCONTINUED | OUTPATIENT
Start: 2021-11-22 | End: 2021-11-25

## 2021-11-22 RX ORDER — PRAMOXINE HYDROCHLORIDE 150 MG/15G
1 AEROSOL, FOAM RECTAL EVERY 4 HOURS
Refills: 0 | Status: DISCONTINUED | OUTPATIENT
Start: 2021-11-22 | End: 2021-11-25

## 2021-11-22 RX ORDER — OXYTOCIN 10 UNIT/ML
2 VIAL (ML) INJECTION
Qty: 30 | Refills: 0 | Status: DISCONTINUED | OUTPATIENT
Start: 2021-11-22 | End: 2021-11-22

## 2021-11-22 RX ORDER — KETOROLAC TROMETHAMINE 30 MG/ML
30 SYRINGE (ML) INJECTION ONCE
Refills: 0 | Status: DISCONTINUED | OUTPATIENT
Start: 2021-11-22 | End: 2021-11-22

## 2021-11-22 RX ORDER — IBUPROFEN 200 MG
600 TABLET ORAL EVERY 6 HOURS
Refills: 0 | Status: DISCONTINUED | OUTPATIENT
Start: 2021-11-22 | End: 2021-11-22

## 2021-11-22 RX ORDER — SODIUM CHLORIDE 9 MG/ML
3 INJECTION INTRAMUSCULAR; INTRAVENOUS; SUBCUTANEOUS EVERY 8 HOURS
Refills: 0 | Status: DISCONTINUED | OUTPATIENT
Start: 2021-11-22 | End: 2021-11-25

## 2021-11-22 RX ORDER — HYDROCORTISONE 1 %
1 OINTMENT (GRAM) TOPICAL EVERY 6 HOURS
Refills: 0 | Status: DISCONTINUED | OUTPATIENT
Start: 2021-11-22 | End: 2021-11-25

## 2021-11-22 RX ORDER — OXYTOCIN 10 UNIT/ML
333.33 VIAL (ML) INJECTION
Qty: 20 | Refills: 0 | Status: DISCONTINUED | OUTPATIENT
Start: 2021-11-22 | End: 2021-11-24

## 2021-11-22 RX ORDER — MAGNESIUM HYDROXIDE 400 MG/1
30 TABLET, CHEWABLE ORAL
Refills: 0 | Status: DISCONTINUED | OUTPATIENT
Start: 2021-11-22 | End: 2021-11-25

## 2021-11-22 RX ORDER — GENTAMICIN SULFATE 40 MG/ML
80 VIAL (ML) INJECTION EVERY 8 HOURS
Refills: 0 | Status: DISCONTINUED | OUTPATIENT
Start: 2021-11-22 | End: 2021-11-22

## 2021-11-22 RX ORDER — BENZOCAINE 10 %
1 GEL (GRAM) MUCOUS MEMBRANE EVERY 6 HOURS
Refills: 0 | Status: DISCONTINUED | OUTPATIENT
Start: 2021-11-22 | End: 2021-11-25

## 2021-11-22 RX ORDER — TETANUS TOXOID, REDUCED DIPHTHERIA TOXOID AND ACELLULAR PERTUSSIS VACCINE, ADSORBED 5; 2.5; 8; 8; 2.5 [IU]/.5ML; [IU]/.5ML; UG/.5ML; UG/.5ML; UG/.5ML
0.5 SUSPENSION INTRAMUSCULAR ONCE
Refills: 0 | Status: DISCONTINUED | OUTPATIENT
Start: 2021-11-22 | End: 2021-11-25

## 2021-11-22 RX ORDER — DIPHENHYDRAMINE HCL 50 MG
25 CAPSULE ORAL EVERY 6 HOURS
Refills: 0 | Status: DISCONTINUED | OUTPATIENT
Start: 2021-11-22 | End: 2021-11-25

## 2021-11-22 RX ORDER — ACETAMINOPHEN 500 MG
975 TABLET ORAL
Refills: 0 | Status: DISCONTINUED | OUTPATIENT
Start: 2021-11-22 | End: 2021-11-25

## 2021-11-22 RX ADMIN — Medication 104 MILLIGRAM(S): at 15:57

## 2021-11-22 RX ADMIN — Medication 100 MILLIGRAM(S): at 16:54

## 2021-11-22 RX ADMIN — Medication 1000 MILLIUNIT(S)/MIN: at 08:41

## 2021-11-22 RX ADMIN — Medication 30 MILLIGRAM(S): at 09:24

## 2021-11-22 RX ADMIN — Medication 975 MILLIGRAM(S): at 14:36

## 2021-11-22 RX ADMIN — Medication 975 MILLIGRAM(S): at 15:06

## 2021-11-22 RX ADMIN — Medication 30 MILLIGRAM(S): at 09:26

## 2021-11-22 RX ADMIN — Medication 216 GRAM(S): at 08:40

## 2021-11-22 RX ADMIN — Medication 216 GRAM(S): at 02:32

## 2021-11-22 RX ADMIN — Medication 975 MILLIGRAM(S): at 08:53

## 2021-11-22 RX ADMIN — SODIUM CHLORIDE 125 MILLILITER(S): 9 INJECTION, SOLUTION INTRAVENOUS at 01:20

## 2021-11-22 RX ADMIN — SODIUM CHLORIDE 3 MILLILITER(S): 9 INJECTION INTRAMUSCULAR; INTRAVENOUS; SUBCUTANEOUS at 21:17

## 2021-11-22 RX ADMIN — Medication 975 MILLIGRAM(S): at 09:26

## 2021-11-22 RX ADMIN — Medication 2 MILLIUNIT(S)/MIN: at 01:10

## 2021-11-22 RX ADMIN — SODIUM CHLORIDE 3 MILLILITER(S): 9 INJECTION INTRAMUSCULAR; INTRAVENOUS; SUBCUTANEOUS at 16:15

## 2021-11-22 RX ADMIN — Medication 104 MILLIGRAM(S): at 06:05

## 2021-11-22 RX ADMIN — Medication 216 GRAM(S): at 14:37

## 2021-11-22 NOTE — CHART NOTE - NSCHARTNOTEFT_GEN_A_CORE
At patient bedside for evaluation of sudden onset chest pressure upon rising to go to the bathroom per RN. Patient reports the pressure is positional, worse with laying down and better with leaning forward. Denies chest pain, shortness of breath, lower extremity swelling/pain, abdominal pain, nausea, vomiting, fever, chills. Bedside vitals: /67, HR 94, temp 96.1, 02 sat 97%.    Gen: NAD, AAOx3  CV: S1S2, RRR, no M/R/G  Pulm: crackles on left lung base, no wheezing, resonant to percussion, no sternal tenderness on palpation  Ext: no calf tenderness or edema     Vital Signs Last 24 Hrs  T(C): 35.6 (2021 13:55), Max: 39.5 (2021 18:31)  T(F): 96.1 (2021 13:55), Max: 103.1 (2021 18:31)  HR: 94 (2021 13:55) (68 - 174)  BP: 108/67 (2021 13:55) (100/57 - 148/77)  BP(mean): 80 (2021 13:55) (75 - 80)  RR: 18 (2021 06:45) (18 - 18)  SpO2: 100% (2021 14:21) (93% - 100%)    A/P: 22yo now P1 s/p , with chorioamnionitis, last temp 103.1 @1831, on amp/gent/clinda, now afebrile, complaining of chest pressure  -EKG ordered and called  -chest xray ordered  -continue antibiotics  -monitor vitals  -routine postpartum care  -f/u 1600 labs    Dr Palm and Dr Kunz aware

## 2021-11-22 NOTE — PROGRESS NOTE ADULT - ASSESSMENT
20yo  @38w5d, GBS neg, s/p SROM clear @1100 on , with chorioamnionitis s/p elevated temp, s/p AROM forewaters blood tinged @1510, s/p maternal and fetal tachycardia, doing well.   -Is s/p cooling blanket  -Resuscitative measures performed with oxygen, repositioning and IV hydration  -Continue amp and gent  -Pain management  -Vital signs  -f/u UDS and UCx  -Maternal and fetal tachycardia resolved at this time, currently afebrile    Dr. Magana and Dr. Kunz aware   22yo  @38w5d, GBS neg, s/p SROM clear @1100 on , with chorioamnionitis Tmax 103.1 @1831, first afebrile @2320, s/p AROM forewaters blood tinged @1510, s/p maternal and fetal tachycardia, doing well.   -s/p cooling blanket  -Resuscitative measures performed with oxygen, repositioning and IV hydration  -Continue amp and gent  -Pain management: functional epidural in place  -Vital signs  -f/u UDS and UCx  -Maternal and fetal tachycardia resolved at this time, currently afebrile    Dr. Magana and Dr. Kunz aware

## 2021-11-22 NOTE — PROGRESS NOTE ADULT - ASSESSMENT
22yo  @38w5d, GBS neg, s/p SROM clear @1100 on , with chorioamnionitis Tmax 103.1 @1831, first afebrile @2320, s/p AROM forewaters blood tinged @1510, now fully dilated and pushing  -s/p cooling blanket  -Continue amp and gent  -Pain management: functional epidural in place  -Vital signs  -f/u UDS and UCx  -Maternal and fetal tachycardia resolved at this time, currently afebrile    Dr. Magana and Dr. Kunz aware

## 2021-11-22 NOTE — OB PROVIDER DELIVERY SUMMARY - NSSELHIDDEN_OBGYN_ALL_OB_FT
[NS_[email or web address]_ALL_OB_FT:[id number]],[NS_DeliveryRN_OBGYN_ALL_OB_FT:[email or web address]],[NS_CirculateRN2_OBGYN_ALL_OB_FT:[id number]]

## 2021-11-22 NOTE — PROGRESS NOTE ADULT - SUBJECTIVE AND OBJECTIVE BOX
PGY1 Note    S: Patient seen and examined at bedside. Doing well, pain well tolerated at this time.    Vital Signs Last 24 Hrs  T(C): 36.7 (2021 03:00), Max: 39.5 (2021 18:31)  T(F): 98.06 (2021 03:00), Max: 103.1 (2021 18:31)  HR: 93 (2021 04:45) (81 - 174)  BP: 112/66 (2021 04:45) (101/55 - 148/77)  RR: 18 (2021 03:00) (18 - 18)  SpO2: 100% (2021 04:43) (93% - 100%)    EFM: 130/mod juan jose/pos accel/juan jose decels - in left lateral, with O2 mask  TOCO: q2-3  SVE: deferred at this time, last exam @0030: 5/90/0, vtx, arom clear    Labs:                        11.0   13.36 )-----------( 260      ( 2021 00:45 )             32.4       11    127<L>  |  94<L>  |  14  ----------------------------<  77  3.5   |  16<L>  |  1.1    Ca    8.0<L>      2021 00:45    TPro  5.0<L>  /  Alb  2.7<L>  /  TBili  1.8<H>  /  DBili  x   /  AST  43<H>  /  ALT  48<H>  /  AlkPhos  338<H>      ABO RH Interpretation: O POS (21 @ 11:51)    Urinalysis Basic - ( 2021 12:01 )  Color: [place] / Appearance: Slightly Turbid / S.026 / pH: x  Gluc: x / Ketone: Large  / Bili: Small / Urobili: 3 mg/dL   Blood: x / Protein: 100 mg/dL / Nitrite: Positive   Leuk Esterase: Large / RBC: 34 /HPF /  /HPF   Sq Epi: x / Non Sq Epi: 5 /HPF / Bacteria: Many    Prenatal Syphilis Test: Nonreact (21 @ 11:51)    UDS: pending  Covid: neg     MEDICATIONS  (STANDING):  ampicillin  IVPB      ampicillin  IVPB 2 Gram(s) IV Intermittent every 6 hours  epidural @1340  gentamicin   IVPB 80 milliGRAM(s) IV Intermittent every 8 hours @ 1330    s/p cooling blanket

## 2021-11-22 NOTE — OB PROVIDER DELIVERY SUMMARY - NSPROVIDERDELIVERYNOTE_OBGYN_ALL_OB_FT
Pt became fully dilated and pushed well over intact perineum, delivery of head in MO position, nose and mouth bulb suctioned, followed by delivery of shoulders and body without difficulty, live male infant, APGARs 9/9 by peds, 3-vessel cord + placenta complete to pathology, b/l periuretral and 2nd degree vaginal lacerations repaired with chromic, no complications

## 2021-11-22 NOTE — PROGRESS NOTE ADULT - SUBJECTIVE AND OBJECTIVE BOX
PGY2 Note    Patient seen at bedside for labor progression. No complaints at the moment.    T(F): 98.5 ( @ 07:08), Max: 103.1 ( @ 18:31)  HR: 104 ( @ 07:33)  BP: 121/79 ( @ 07:30) (101/55 - 148/77)  RR: 18 ( @ 06:45)    EFM: 135/mod juan jose/+accels  TOCO: q2min   SVE: 10/100/3/vtx/rutpured @0720 exam by     Medications:  acetaminophen     Tablet ..: 650 ( @ 17:41)  acetaminophen     Tablet ..: 650 ( @ 13:45)  acetaminophen  Suppository ..: 650 ( @ :46)  acetaminophen  Suppository ..: 650 ( @ 17:42)  ampicillin  IVPB: 216 ( @ 13:48)  ampicillin  IVPB: 216 ( @ 02:32),  216 ( @ 20:32)  cefTRIAXone   IVPB: 100 ( @ 15:34)  dextrose 5% + sodium chloride 0.45%.: 125 ( @ 01:19)  gentamicin   IVPB: 104 ( @ 06:05),  104 ( @ 22:03),  104 ( @ 13:33)  oxytocin Infusion.: 2 ( @ 00:30), currently @6mu/min  epidural started @1340     Labs:                        11.0   13.36 )-----------( 260      ( 2021 00:45 )             32.4         127<L>  |  94<L>  |  14  ----------------------------<  77  3.5   |  16<L>  |  1.1    Ca    8.0<L>      2021 00:45    TPro  5.0<L>  /  Alb  2.7<L>  /  TBili  1.8<H>  /  DBili  x   /  AST  43<H>  /  ALT  48<H>  /  AlkPhos  338<H>      Prenatal Syphilis Test: Nonreact ( @ 11:51)  Antibody Screen: NEG (21 @ 11:51)    Urinalysis Basic - ( 2021 12:01 )    Color: [place] / Appearance: Slightly Turbid / S.026 / pH: x  Gluc: x / Ketone: Large  / Bili: Small / Urobili: 3 mg/dL   Blood: x / Protein: 100 mg/dL / Nitrite: Positive   Leuk Esterase: Large / RBC: 34 /HPF /  /HPF   Sq Epi: x / Non Sq Epi: 5 /HPF / Bacteria: Many    UDS neg  [id number] neg

## 2021-11-23 RX ORDER — AMPICILLIN SODIUM AND SULBACTAM SODIUM 250; 125 MG/ML; MG/ML
3 INJECTION, POWDER, FOR SUSPENSION INTRAMUSCULAR; INTRAVENOUS ONCE
Refills: 0 | Status: COMPLETED | OUTPATIENT
Start: 2021-11-23 | End: 2021-11-23

## 2021-11-23 RX ORDER — ACETAMINOPHEN 500 MG
1000 TABLET ORAL ONCE
Refills: 0 | Status: DISCONTINUED | OUTPATIENT
Start: 2021-11-23 | End: 2021-11-23

## 2021-11-23 RX ORDER — AMPICILLIN SODIUM AND SULBACTAM SODIUM 250; 125 MG/ML; MG/ML
INJECTION, POWDER, FOR SUSPENSION INTRAMUSCULAR; INTRAVENOUS
Refills: 0 | Status: DISCONTINUED | OUTPATIENT
Start: 2021-11-23 | End: 2021-11-25

## 2021-11-23 RX ORDER — AMPICILLIN SODIUM AND SULBACTAM SODIUM 250; 125 MG/ML; MG/ML
3 INJECTION, POWDER, FOR SUSPENSION INTRAMUSCULAR; INTRAVENOUS EVERY 6 HOURS
Refills: 0 | Status: DISCONTINUED | OUTPATIENT
Start: 2021-11-23 | End: 2021-11-25

## 2021-11-23 RX ADMIN — AMPICILLIN SODIUM AND SULBACTAM SODIUM 200 GRAM(S): 250; 125 INJECTION, POWDER, FOR SUSPENSION INTRAMUSCULAR; INTRAVENOUS at 08:27

## 2021-11-23 RX ADMIN — Medication 975 MILLIGRAM(S): at 14:55

## 2021-11-23 RX ADMIN — SODIUM CHLORIDE 3 MILLILITER(S): 9 INJECTION INTRAMUSCULAR; INTRAVENOUS; SUBCUTANEOUS at 18:35

## 2021-11-23 RX ADMIN — AMPICILLIN SODIUM AND SULBACTAM SODIUM 200 GRAM(S): 250; 125 INJECTION, POWDER, FOR SUSPENSION INTRAMUSCULAR; INTRAVENOUS at 20:27

## 2021-11-23 RX ADMIN — SODIUM CHLORIDE 3 MILLILITER(S): 9 INJECTION INTRAMUSCULAR; INTRAVENOUS; SUBCUTANEOUS at 21:18

## 2021-11-23 RX ADMIN — Medication 975 MILLIGRAM(S): at 04:17

## 2021-11-23 RX ADMIN — SODIUM CHLORIDE 3 MILLILITER(S): 9 INJECTION INTRAMUSCULAR; INTRAVENOUS; SUBCUTANEOUS at 06:12

## 2021-11-23 RX ADMIN — AMPICILLIN SODIUM AND SULBACTAM SODIUM 200 GRAM(S): 250; 125 INJECTION, POWDER, FOR SUSPENSION INTRAMUSCULAR; INTRAVENOUS at 14:48

## 2021-11-23 RX ADMIN — Medication 975 MILLIGRAM(S): at 15:00

## 2021-11-23 RX ADMIN — AMPICILLIN SODIUM AND SULBACTAM SODIUM 200 GRAM(S): 250; 125 INJECTION, POWDER, FOR SUSPENSION INTRAMUSCULAR; INTRAVENOUS at 02:01

## 2021-11-23 RX ADMIN — Medication 975 MILLIGRAM(S): at 14:56

## 2021-11-23 RX ADMIN — Medication 975 MILLIGRAM(S): at 08:27

## 2021-11-23 RX ADMIN — Medication 975 MILLIGRAM(S): at 03:47

## 2021-11-23 NOTE — PROGRESS NOTE ADULT - SUBJECTIVE AND OBJECTIVE BOX
Pt seen at bedside, no complaints, nursing/pumping    Vital Signs Last 24 Hrs  T(C): 36.2 (23 Nov 2021 06:23), Max: 38.3 (23 Nov 2021 03:41)  T(F): 97.2 (23 Nov 2021 06:23), Max: 101 (23 Nov 2021 03:41)  HR: 85 (23 Nov 2021 06:23) (68 - 120)  BP: 100/61 (23 Nov 2021 06:23) (100/57 - 120/72)  BP(mean): 80 (22 Nov 2021 13:55) (75 - 80)  RR: 18 (23 Nov 2021 06:23) (16 - 18)  SpO2: 98% (23 Nov 2021 06:23) (96% - 100%)    Resp - CTA b/l  CVS - S1S2+, RRR  Breasts - soft, NT  Abd - soft, NTND, BS+, uterus - firm  VE - Moderate lochia, perineum- intact  Ext - No Homans                            8.3    12.28 )-----------( 295      ( 22 Nov 2021 21:56 )             24.9                         11.0   13.36 )-----------( 260      ( 22 Nov 2021 00:45 )             32.4                         12.0   12.05 )-----------( 359      ( 21 Nov 2021 18:09 )             36.8     O POS, Rubella - NON-IMMUNE, [place]ube[place] - Immune, RPR - NR

## 2021-11-24 RX ORDER — SODIUM CHLORIDE 9 MG/ML
1000 INJECTION, SOLUTION INTRAVENOUS ONCE
Refills: 0 | Status: DISCONTINUED | OUTPATIENT
Start: 2021-11-24 | End: 2021-11-24

## 2021-11-24 RX ORDER — OXYTOCIN 10 UNIT/ML
333.33 VIAL (ML) INJECTION
Qty: 20 | Refills: 0 | Status: DISCONTINUED | OUTPATIENT
Start: 2021-11-24 | End: 2021-11-24

## 2021-11-24 RX ORDER — CEFAZOLIN SODIUM 1 G
2000 VIAL (EA) INJECTION ONCE
Refills: 0 | Status: DISCONTINUED | OUTPATIENT
Start: 2021-11-24 | End: 2021-11-24

## 2021-11-24 RX ORDER — SODIUM CHLORIDE 9 MG/ML
1000 INJECTION, SOLUTION INTRAVENOUS
Refills: 0 | Status: DISCONTINUED | OUTPATIENT
Start: 2021-11-24 | End: 2021-11-24

## 2021-11-24 RX ORDER — FAMOTIDINE 10 MG/ML
20 INJECTION INTRAVENOUS ONCE
Refills: 0 | Status: DISCONTINUED | OUTPATIENT
Start: 2021-11-24 | End: 2021-11-24

## 2021-11-24 RX ADMIN — SODIUM CHLORIDE 3 MILLILITER(S): 9 INJECTION INTRAMUSCULAR; INTRAVENOUS; SUBCUTANEOUS at 20:13

## 2021-11-24 RX ADMIN — AMPICILLIN SODIUM AND SULBACTAM SODIUM 200 GRAM(S): 250; 125 INJECTION, POWDER, FOR SUSPENSION INTRAMUSCULAR; INTRAVENOUS at 01:52

## 2021-11-24 RX ADMIN — SODIUM CHLORIDE 3 MILLILITER(S): 9 INJECTION INTRAMUSCULAR; INTRAVENOUS; SUBCUTANEOUS at 05:23

## 2021-11-24 RX ADMIN — AMPICILLIN SODIUM AND SULBACTAM SODIUM 200 GRAM(S): 250; 125 INJECTION, POWDER, FOR SUSPENSION INTRAMUSCULAR; INTRAVENOUS at 13:06

## 2021-11-24 RX ADMIN — SODIUM CHLORIDE 3 MILLILITER(S): 9 INJECTION INTRAMUSCULAR; INTRAVENOUS; SUBCUTANEOUS at 13:06

## 2021-11-24 RX ADMIN — AMPICILLIN SODIUM AND SULBACTAM SODIUM 200 GRAM(S): 250; 125 INJECTION, POWDER, FOR SUSPENSION INTRAMUSCULAR; INTRAVENOUS at 20:07

## 2021-11-24 RX ADMIN — AMPICILLIN SODIUM AND SULBACTAM SODIUM 200 GRAM(S): 250; 125 INJECTION, POWDER, FOR SUSPENSION INTRAMUSCULAR; INTRAVENOUS at 08:39

## 2021-11-24 NOTE — PROGRESS NOTE ADULT - ASSESSMENT
IMP: s/p , Chorio/Pyelo - PPd # 2 - stable    Plan: Continue IV Abx for 24 more hours, NSAID's/PNV's, anticipateddc -

## 2021-11-24 NOTE — PROGRESS NOTE ADULT - SUBJECTIVE AND OBJECTIVE BOX
Pt seen at bedside, no complaints, nursing    Vital Signs Last 24 Hrs  T(C): 37.1 (25 Nov 2021 08:08), Max: 37.1 (25 Nov 2021 08:08)  T(F): 98.7 (25 Nov 2021 08:08), Max: 98.7 (25 Nov 2021 08:08)  HR: 85 (25 Nov 2021 08:08) (81 - 85)  BP: 122/73 (25 Nov 2021 08:08) (117/66 - 122/73)  RR: 18 (25 Nov 2021 08:08) (18 - 18)    Resp - CTA b/l  CVS - S1S2+, RRR  Breasts - soft, NT  Abd - soft, NTND, BS+, uterus - firm  VE - Minimal lochia, perineum- intact  Ext - No Homans

## 2021-11-25 ENCOUNTER — TRANSCRIPTION ENCOUNTER (OUTPATIENT)
Age: 21
End: 2021-11-25

## 2021-11-25 VITALS
RESPIRATION RATE: 18 BRPM | TEMPERATURE: 99 F | HEART RATE: 85 BPM | SYSTOLIC BLOOD PRESSURE: 122 MMHG | DIASTOLIC BLOOD PRESSURE: 73 MMHG

## 2021-11-25 RX ORDER — BENZOCAINE 10 %
1 GEL (GRAM) MUCOUS MEMBRANE
Qty: 0 | Refills: 0 | DISCHARGE
Start: 2021-11-25

## 2021-11-25 RX ORDER — ACETAMINOPHEN 500 MG
3 TABLET ORAL
Qty: 0 | Refills: 0 | DISCHARGE
Start: 2021-11-25

## 2021-11-25 RX ORDER — AER TRAVELER 0.5 G/1
1 SOLUTION RECTAL; TOPICAL
Qty: 0 | Refills: 0 | DISCHARGE
Start: 2021-11-25

## 2021-11-25 RX ORDER — SIMETHICONE 80 MG/1
1 TABLET, CHEWABLE ORAL
Qty: 0 | Refills: 0 | DISCHARGE
Start: 2021-11-25

## 2021-11-25 RX ADMIN — AMPICILLIN SODIUM AND SULBACTAM SODIUM 200 GRAM(S): 250; 125 INJECTION, POWDER, FOR SUSPENSION INTRAMUSCULAR; INTRAVENOUS at 02:25

## 2021-11-25 RX ADMIN — Medication 1 APPLICATION(S): at 06:30

## 2021-11-25 RX ADMIN — AER TRAVELER 1 APPLICATION(S): 0.5 SOLUTION RECTAL; TOPICAL at 06:30

## 2021-11-25 RX ADMIN — SODIUM CHLORIDE 3 MILLILITER(S): 9 INJECTION INTRAMUSCULAR; INTRAVENOUS; SUBCUTANEOUS at 06:27

## 2021-11-25 RX ADMIN — Medication 1 SPRAY(S): at 06:30

## 2021-11-25 RX ADMIN — AMPICILLIN SODIUM AND SULBACTAM SODIUM 200 GRAM(S): 250; 125 INJECTION, POWDER, FOR SUSPENSION INTRAMUSCULAR; INTRAVENOUS at 08:05

## 2021-11-25 NOTE — PROGRESS NOTE ADULT - SUBJECTIVE AND OBJECTIVE BOX
Pt seen at bedside, no complaints, nursing    Vital Signs Last 24 Hrs  T(C): 37.1 (25 Nov 2021 08:08), Max: 37.1 (25 Nov 2021 08:08)  T(F): 98.7 (25 Nov 2021 08:08), Max: 98.7 (25 Nov 2021 08:08)  HR: 85 (25 Nov 2021 08:08) (81 - 85)  BP: 122/73 (25 Nov 2021 08:08) (117/66 - 122/77)  RR: 18 (25 Nov 2021 08:08) (18 - 18)    Resp - CTA b/l  CVS - S1S2+, RRR  Breasts - soft, NT  Abd - soft, NTND, BS+, uterus - firm  VE - Minimal lochia, perineum- intact  Ext - No Homans

## 2021-11-25 NOTE — DISCHARGE NOTE OB - CARE PLAN
Principal Discharge DX:	Vaginal delivery  Assessment and plan of treatment:	healthy mother and baby   1

## 2021-11-25 NOTE — DISCHARGE NOTE OB - CARE PROVIDER_API CALL
Salvatore Kunz)  Obstetrics and Gynecology  [address] [place] [address]  Phone: [phone]  Fax: [phone]  Established Patient  Follow Up Time: Routine

## 2021-11-25 NOTE — DISCHARGE NOTE OB - HOSPITAL COURSE
vaginal delivery complicated by chorioamnionitis s/p treatment with intravenous antibioitics, postpartum course complicated by pyelonephritis s/p treatment with intravenous antibiotics, discharged home in stable condition

## 2021-11-25 NOTE — DISCHARGE NOTE OB - PATIENT PORTAL LINK FT
You can access the FollowMyHealth Patient Portal offered by Health system by registering at the following website: [email or web address]. By joining [employer]’s FollowMyHealth portal, you will also be able to view your health information using other applications (apps) compatible with our system.

## 2021-11-25 NOTE — DISCHARGE NOTE OB - MEDICATION SUMMARY - MEDICATIONS TO TAKE
I will START or STAY ON the medications listed below when I get home from the hospital:    acetaminophen 325 mg oral tablet  -- 3 tab(s) by mouth every 6 hours, As Needed  -- Indication: For pain    witch hazel 50% topical pad  -- 1 application on skin every 4 hours, As needed, Perineal discomfort  -- Indication: For perineal discomfort    benzocaine 20% topical spray  -- 1 spray(s) on skin every 6 hours, As needed, for Perineal discomfort  -- Indication: For perineal discomfort    Prenatal Multivitamins with Folic Acid 1 mg oral tablet  -- 1 tab(s) by mouth once a day  -- Indication: For postpartum    simethicone 80 mg oral tablet, chewable  -- 1 tab(s) by mouth every 4 hours, As needed, Gas  -- Indication: For gas

## 2021-11-26 LAB — SURGICAL PATHOLOGY STUDY: SIGNIFICANT CHANGE UP

## 2021-11-30 DIAGNOSIS — O41.1230 CHORIOAMNIONITIS, THIRD TRIMESTER, NOT APPLICABLE OR UNSPECIFIED: ICD-10-CM

## 2021-11-30 DIAGNOSIS — Z3A.38 38 WEEKS GESTATION OF PREGNANCY: ICD-10-CM

## 2022-05-10 ENCOUNTER — NON-APPOINTMENT (OUTPATIENT)
Age: 22
End: 2022-05-10

## 2022-05-27 ENCOUNTER — APPOINTMENT (OUTPATIENT)
Dept: INTERNAL MEDICINE | Facility: CLINIC | Age: 22
End: 2022-05-27

## 2023-06-15 NOTE — PROCEDURE NOTE - NSANESPROCED_OBGYN_ALL_OB
loss at 5 , catheter at 10/Epidural Catheter Placement Suturegard Intro: Intraoperative tissue expansion was performed, utilizing the SUTUREGARD device, in order to reduce wound tension.

## 2025-02-06 ENCOUNTER — NON-APPOINTMENT (OUTPATIENT)
Age: 25
End: 2025-02-06